# Patient Record
Sex: FEMALE | URBAN - METROPOLITAN AREA
[De-identification: names, ages, dates, MRNs, and addresses within clinical notes are randomized per-mention and may not be internally consistent; named-entity substitution may affect disease eponyms.]

---

## 2017-03-07 ENCOUNTER — TRANSFERRED RECORDS (OUTPATIENT)
Dept: HEALTH INFORMATION MANAGEMENT | Facility: CLINIC | Age: 42
End: 2017-03-07

## 2025-02-25 ENCOUNTER — TRANSFERRED RECORDS (OUTPATIENT)
Dept: HEALTH INFORMATION MANAGEMENT | Facility: CLINIC | Age: 50
End: 2025-02-25
Payer: COMMERCIAL

## 2025-03-21 ENCOUNTER — TRANSFERRED RECORDS (OUTPATIENT)
Dept: HEALTH INFORMATION MANAGEMENT | Facility: CLINIC | Age: 50
End: 2025-03-21
Payer: COMMERCIAL

## 2025-03-21 ENCOUNTER — MEDICAL CORRESPONDENCE (OUTPATIENT)
Dept: HEALTH INFORMATION MANAGEMENT | Facility: CLINIC | Age: 50
End: 2025-03-21
Payer: COMMERCIAL

## 2025-03-24 ENCOUNTER — TRANSCRIBE ORDERS (OUTPATIENT)
Dept: OTHER | Age: 50
End: 2025-03-24

## 2025-03-24 DIAGNOSIS — M89.9 LESION OF PELVIC BONE: Primary | ICD-10-CM

## 2025-03-31 NOTE — TELEPHONE ENCOUNTER
Action March 31, 2025 9:59 AM MT   Action Taken Sent rayus report to scan.  Sent a request to Binu Manteo for imaging.       DIAGNOSIS: Lesion of pelvic bone [M89.9]  - Primary   APPOINTMENT DATE: 04/11/2025   NOTES STATUS DETAILS   OFFICE NOTE from referring provider In process George Iglesias DO  [facility]   OFFICE NOTE from other specialist Care Everywhere    [facility] MED BONE SCAN In process [facility]:  05/17/2021 - Whole Body   MRI In process [facility]: PACS  02/25/2025 - Left Hip    [facility]:  09/03/2021, 05/06/2021 - Pelvis   CT SCAN In process [facility]:  11/04/2022, 06/23/2021, 08/23/2019, 06/27/2016 - Abd/Pel   XRAYS (IMAGES & REPORTS) In process [facility]: PACS  02/21/2025 - Hip/Pelvis    [facility]:  11/01/2019 - RT Hip/Pelvis

## 2025-04-11 ENCOUNTER — PRE VISIT (OUTPATIENT)
Dept: ORTHOPEDICS | Facility: CLINIC | Age: 50
End: 2025-04-11